# Patient Record
Sex: FEMALE | Race: WHITE | Employment: FULL TIME | URBAN - METROPOLITAN AREA
[De-identification: names, ages, dates, MRNs, and addresses within clinical notes are randomized per-mention and may not be internally consistent; named-entity substitution may affect disease eponyms.]

---

## 2021-10-19 ENCOUNTER — HOSPITAL ENCOUNTER (OUTPATIENT)
Dept: WOMENS IMAGING | Age: 31
Discharge: HOME | End: 2021-10-19
Payer: COMMERCIAL

## 2021-10-19 ENCOUNTER — HOSPITAL ENCOUNTER (OUTPATIENT)
Dept: ULTRASOUND IMAGING | Age: 31
Discharge: HOME | End: 2021-10-19
Payer: COMMERCIAL

## 2021-10-19 DIAGNOSIS — N64.4 BREAST PAIN: ICD-10-CM

## 2021-10-19 PROCEDURE — 76642 ULTRASOUND BREAST LIMITED: CPT

## 2021-10-19 PROCEDURE — G0279 TOMOSYNTHESIS, MAMMO: HCPCS

## 2021-10-19 NOTE — PROGRESS NOTES
Dr. Zebedee Jeans spoke to patient regarding recommendation for breast biopsy. Pt given Dr. Radha Harrell contact information, as Dr. Juan Padilla refers her patients to [address] [place] [address] Surgeons for biopsies and follow up breast care. Pt verbalized understanding and will call to schedule an appt with Dr. Radha Harrell. All questions answered at this time. Records faxed to Dr. Radha Harrell.

## 2022-01-18 ENCOUNTER — ANESTHESIA (OUTPATIENT)
Dept: LABOR AND DELIVERY | Age: 32
End: 2022-01-18
Payer: COMMERCIAL

## 2022-01-18 ENCOUNTER — ANESTHESIA EVENT (OUTPATIENT)
Dept: LABOR AND DELIVERY | Age: 32
End: 2022-01-18
Payer: COMMERCIAL

## 2022-01-18 ENCOUNTER — HOSPITAL ENCOUNTER (OUTPATIENT)
Age: 32
Setting detail: SURGERY ADMIT
Discharge: HOME | End: 2022-01-18
Attending: OBSTETRICS & GYNECOLOGY | Admitting: OBSTETRICS & GYNECOLOGY
Payer: COMMERCIAL

## 2022-01-18 VITALS
OXYGEN SATURATION: 100 % | DIASTOLIC BLOOD PRESSURE: 57 MMHG | BODY MASS INDEX: 19.74 KG/M2 | TEMPERATURE: 97.6 F | HEART RATE: 50 BPM | SYSTOLIC BLOOD PRESSURE: 106 MMHG | WEIGHT: 141 LBS | HEIGHT: 71 IN | RESPIRATION RATE: 14 BRPM

## 2022-01-18 VITALS
DIASTOLIC BLOOD PRESSURE: 57 MMHG | OXYGEN SATURATION: 99 % | TEMPERATURE: 98.6 F | RESPIRATION RATE: 2 BRPM | SYSTOLIC BLOOD PRESSURE: 112 MMHG

## 2022-01-18 DIAGNOSIS — O02.1 MISSED ABORTION WITH FETAL DEMISE BEFORE 20 COMPLETED WEEKS OF GESTATION: Primary | ICD-10-CM

## 2022-01-18 LAB
ABO/RH: NORMAL
ANTIBODY SCREEN: NORMAL
BASOPHILS ABSOLUTE: 0 K/UL (ref 0–0.2)
BASOPHILS RELATIVE PERCENT: 0.3 %
EOSINOPHILS ABSOLUTE: 0.2 K/UL (ref 0–0.6)
EOSINOPHILS RELATIVE PERCENT: 2.9 %
HCT VFR BLD CALC: 40.7 % (ref 36–48)
HEMOGLOBIN: 14.1 G/DL (ref 12–16)
LYMPHOCYTES ABSOLUTE: 1.6 K/UL (ref 1–5.1)
LYMPHOCYTES RELATIVE PERCENT: 23.2 %
MCH RBC QN AUTO: 29.6 PG (ref 26–34)
MCHC RBC AUTO-ENTMCNC: 34.5 G/DL (ref 31–36)
MCV RBC AUTO: 85.6 FL (ref 80–100)
MONOCYTES ABSOLUTE: 0.5 K/UL (ref 0–1.3)
MONOCYTES RELATIVE PERCENT: 7.9 %
NEUTROPHILS ABSOLUTE: 4.5 K/UL (ref 1.7–7.7)
NEUTROPHILS RELATIVE PERCENT: 65.7 %
PDW BLD-RTO: 13.1 % (ref 12.4–15.4)
PLATELET # BLD: 184 K/UL (ref 135–450)
PMV BLD AUTO: 8.7 FL (ref 5–10.5)
RBC # BLD: 4.76 M/UL (ref 4–5.2)
WBC # BLD: 6.8 K/UL (ref 4–11)

## 2022-01-18 PROCEDURE — 2500000003 HC RX 250 WO HCPCS: Performed by: OBSTETRICS & GYNECOLOGY

## 2022-01-18 PROCEDURE — 3700000001 HC ADD 15 MINUTES (ANESTHESIA): Performed by: OBSTETRICS & GYNECOLOGY

## 2022-01-18 PROCEDURE — 88305 TISSUE EXAM BY PATHOLOGIST: CPT

## 2022-01-18 PROCEDURE — 2580000003 HC RX 258: Performed by: OBSTETRICS & GYNECOLOGY

## 2022-01-18 PROCEDURE — 2580000003 HC RX 258: Performed by: NURSE ANESTHETIST, CERTIFIED REGISTERED

## 2022-01-18 PROCEDURE — 86901 BLOOD TYPING SEROLOGIC RH(D): CPT

## 2022-01-18 PROCEDURE — 3700000000 HC ANESTHESIA ATTENDED CARE: Performed by: OBSTETRICS & GYNECOLOGY

## 2022-01-18 PROCEDURE — 86850 RBC ANTIBODY SCREEN: CPT

## 2022-01-18 PROCEDURE — 7100000001 HC PACU RECOVERY - ADDTL 15 MIN: Performed by: OBSTETRICS & GYNECOLOGY

## 2022-01-18 PROCEDURE — 86900 BLOOD TYPING SEROLOGIC ABO: CPT

## 2022-01-18 PROCEDURE — 2500000003 HC RX 250 WO HCPCS: Performed by: NURSE ANESTHETIST, CERTIFIED REGISTERED

## 2022-01-18 PROCEDURE — 3600000012 HC SURGERY LEVEL 2 ADDTL 15MIN: Performed by: OBSTETRICS & GYNECOLOGY

## 2022-01-18 PROCEDURE — 85025 COMPLETE CBC W/AUTO DIFF WBC: CPT

## 2022-01-18 PROCEDURE — 2709999900 HC NON-CHARGEABLE SUPPLY: Performed by: OBSTETRICS & GYNECOLOGY

## 2022-01-18 PROCEDURE — 3600000002 HC SURGERY LEVEL 2 BASE: Performed by: OBSTETRICS & GYNECOLOGY

## 2022-01-18 PROCEDURE — 6360000002 HC RX W HCPCS: Performed by: NURSE ANESTHETIST, CERTIFIED REGISTERED

## 2022-01-18 PROCEDURE — 7100000000 HC PACU RECOVERY - FIRST 15 MIN: Performed by: OBSTETRICS & GYNECOLOGY

## 2022-01-18 PROCEDURE — 51701 INSERT BLADDER CATHETER: CPT

## 2022-01-18 RX ORDER — GLYCOPYRROLATE 0.2 MG/ML
INJECTION INTRAMUSCULAR; INTRAVENOUS PRN
Status: DISCONTINUED | OUTPATIENT
Start: 2022-01-18 | End: 2022-01-18 | Stop reason: SDUPTHER

## 2022-01-18 RX ORDER — KETOROLAC TROMETHAMINE 10 MG/1
10 TABLET, FILM COATED ORAL EVERY 6 HOURS PRN
Qty: 20 TABLET | Refills: 0 | Status: SHIPPED | OUTPATIENT
Start: 2022-01-18 | End: 2022-01-23

## 2022-01-18 RX ORDER — SODIUM CHLORIDE, SODIUM LACTATE, POTASSIUM CHLORIDE, CALCIUM CHLORIDE 600; 310; 30; 20 MG/100ML; MG/100ML; MG/100ML; MG/100ML
INJECTION, SOLUTION INTRAVENOUS CONTINUOUS PRN
Status: DISCONTINUED | OUTPATIENT
Start: 2022-01-18 | End: 2022-01-18 | Stop reason: SDUPTHER

## 2022-01-18 RX ORDER — KETOROLAC TROMETHAMINE 30 MG/ML
INJECTION, SOLUTION INTRAMUSCULAR; INTRAVENOUS PRN
Status: DISCONTINUED | OUTPATIENT
Start: 2022-01-18 | End: 2022-01-18 | Stop reason: SDUPTHER

## 2022-01-18 RX ORDER — OXYCODONE HYDROCHLORIDE AND ACETAMINOPHEN 5; 325 MG/1; MG/1
1 TABLET ORAL EVERY 6 HOURS PRN
Qty: 20 TABLET | Refills: 0 | Status: SHIPPED | OUTPATIENT
Start: 2022-01-18 | End: 2022-01-23

## 2022-01-18 RX ORDER — DEXAMETHASONE SODIUM PHOSPHATE 4 MG/ML
INJECTION, SOLUTION INTRA-ARTICULAR; INTRALESIONAL; INTRAMUSCULAR; INTRAVENOUS; SOFT TISSUE PRN
Status: DISCONTINUED | OUTPATIENT
Start: 2022-01-18 | End: 2022-01-18 | Stop reason: SDUPTHER

## 2022-01-18 RX ORDER — ONDANSETRON 2 MG/ML
INJECTION INTRAMUSCULAR; INTRAVENOUS PRN
Status: DISCONTINUED | OUTPATIENT
Start: 2022-01-18 | End: 2022-01-18 | Stop reason: SDUPTHER

## 2022-01-18 RX ORDER — FENTANYL CITRATE 50 UG/ML
INJECTION, SOLUTION INTRAMUSCULAR; INTRAVENOUS PRN
Status: DISCONTINUED | OUTPATIENT
Start: 2022-01-18 | End: 2022-01-18 | Stop reason: SDUPTHER

## 2022-01-18 RX ORDER — LIDOCAINE HYDROCHLORIDE 20 MG/ML
INJECTION, SOLUTION INFILTRATION; PERINEURAL PRN
Status: DISCONTINUED | OUTPATIENT
Start: 2022-01-18 | End: 2022-01-18 | Stop reason: SDUPTHER

## 2022-01-18 RX ORDER — SODIUM CHLORIDE, SODIUM LACTATE, POTASSIUM CHLORIDE, CALCIUM CHLORIDE 600; 310; 30; 20 MG/100ML; MG/100ML; MG/100ML; MG/100ML
INJECTION, SOLUTION INTRAVENOUS CONTINUOUS
Status: DISCONTINUED | OUTPATIENT
Start: 2022-01-18 | End: 2022-01-18 | Stop reason: HOSPADM

## 2022-01-18 RX ORDER — PROPOFOL 10 MG/ML
INJECTION, EMULSION INTRAVENOUS PRN
Status: DISCONTINUED | OUTPATIENT
Start: 2022-01-18 | End: 2022-01-18 | Stop reason: SDUPTHER

## 2022-01-18 RX ORDER — MIDAZOLAM HYDROCHLORIDE 1 MG/ML
INJECTION INTRAMUSCULAR; INTRAVENOUS PRN
Status: DISCONTINUED | OUTPATIENT
Start: 2022-01-18 | End: 2022-01-18 | Stop reason: SDUPTHER

## 2022-01-18 RX ORDER — EPHEDRINE SULFATE/0.9% NACL/PF 50 MG/5 ML
SYRINGE (ML) INTRAVENOUS PRN
Status: DISCONTINUED | OUTPATIENT
Start: 2022-01-18 | End: 2022-01-18 | Stop reason: SDUPTHER

## 2022-01-18 RX ADMIN — DEXAMETHASONE SODIUM PHOSPHATE 8 MG: 4 INJECTION, SOLUTION INTRAMUSCULAR; INTRAVENOUS at 10:36

## 2022-01-18 RX ADMIN — Medication 10 MG: at 10:30

## 2022-01-18 RX ADMIN — PROPOFOL 200 MG: 10 INJECTION, EMULSION INTRAVENOUS at 10:26

## 2022-01-18 RX ADMIN — MIDAZOLAM 2 MG: 1 INJECTION INTRAMUSCULAR; INTRAVENOUS at 10:12

## 2022-01-18 RX ADMIN — SODIUM CHLORIDE, SODIUM LACTATE, POTASSIUM CHLORIDE, AND CALCIUM CHLORIDE: .6; .31; .03; .02 INJECTION, SOLUTION INTRAVENOUS at 10:16

## 2022-01-18 RX ADMIN — FENTANYL CITRATE 100 MCG: 50 INJECTION INTRAMUSCULAR; INTRAVENOUS at 10:26

## 2022-01-18 RX ADMIN — SODIUM CHLORIDE, SODIUM LACTATE, POTASSIUM CHLORIDE, AND CALCIUM CHLORIDE: .6; .31; .03; .02 INJECTION, SOLUTION INTRAVENOUS at 08:00

## 2022-01-18 RX ADMIN — LIDOCAINE HYDROCHLORIDE 50 MG: 20 INJECTION, SOLUTION INFILTRATION; PERINEURAL at 10:26

## 2022-01-18 RX ADMIN — DOXYCYCLINE 200 MG: 100 INJECTION, POWDER, LYOPHILIZED, FOR SOLUTION INTRAVENOUS at 08:30

## 2022-01-18 RX ADMIN — ONDANSETRON 4 MG: 2 INJECTION INTRAMUSCULAR; INTRAVENOUS at 10:26

## 2022-01-18 RX ADMIN — GLYCOPYRROLATE 0.2 MG: 0.2 INJECTION, SOLUTION INTRAMUSCULAR; INTRAVENOUS at 10:32

## 2022-01-18 RX ADMIN — FAMOTIDINE 20 MG: 10 INJECTION, SOLUTION INTRAVENOUS at 08:48

## 2022-01-18 RX ADMIN — KETOROLAC TROMETHAMINE 30 MG: 30 INJECTION, SOLUTION INTRAMUSCULAR at 10:43

## 2022-01-18 ASSESSMENT — PULMONARY FUNCTION TESTS
PIF_VALUE: 13
PIF_VALUE: 3
PIF_VALUE: 3
PIF_VALUE: 10
PIF_VALUE: 1
PIF_VALUE: 3
PIF_VALUE: 0
PIF_VALUE: 6
PIF_VALUE: 2
PIF_VALUE: 2
PIF_VALUE: 4
PIF_VALUE: 0
PIF_VALUE: 3
PIF_VALUE: 3
PIF_VALUE: 4
PIF_VALUE: 0
PIF_VALUE: 0
PIF_VALUE: 1
PIF_VALUE: 0
PIF_VALUE: 3
PIF_VALUE: 1
PIF_VALUE: 0
PIF_VALUE: 3
PIF_VALUE: 10
PIF_VALUE: 7
PIF_VALUE: 7
PIF_VALUE: 3
PIF_VALUE: 7
PIF_VALUE: 3
PIF_VALUE: 1
PIF_VALUE: 1
PIF_VALUE: 0
PIF_VALUE: 3
PIF_VALUE: 3
PIF_VALUE: 14
PIF_VALUE: 3
PIF_VALUE: 10
PIF_VALUE: 3
PIF_VALUE: 0

## 2022-01-18 ASSESSMENT — LIFESTYLE VARIABLES: SMOKING_STATUS: 1

## 2022-01-18 NOTE — ANESTHESIA POSTPROCEDURE EVALUATION
Department of Anesthesiology  Postprocedure Note    Patient: Raz Devries  MRN: [id number]  YOB: 1990  Date of evaluation: 1/18/2022  Time:  11:09 AM     Procedure Summary     Date: 01/18/22 [facility] [address] [place] [address]    Anesthesia Start: 1016 Anesthesia Stop: 1100    Procedure: DILATATION AND CURETTAGE SUCTION (N/A Uterus) Diagnosis: (caminiti,2/0, d&c w/ suction)    Surgeons: Catarino Tinajero MD Responsible Provider: Edgardo Pham MD    Anesthesia Type: general ASA Status: 2          Anesthesia Type: general    Tonie Phase I: Tonie Score: 10    Tonie Phase II:      Last vitals: Reviewed and per EMR flowsheets.        Anesthesia Post Evaluation    Patient location: Recovering in OR due to Covid 19+  Patient participation: complete - patient participated  Level of consciousness: awake and alert  Pain score: 0  Airway patency: patent  Nausea & Vomiting: no vomiting and no nausea  Complications: no  Cardiovascular status: blood pressure returned to baseline and hemodynamically stable  Respiratory status: acceptable and room air  Hydration status: stable

## 2022-01-18 NOTE — ANESTHESIA PRE PROCEDURE
Department of Anesthesiology  Preprocedure Note       Name:  Pratik Patton   Age:  36 y.o.  :  1990                                          MRN:  [id number]         Date:  2022      Surgeon: Naa Paul):  aJne Carroll MD    Procedure: Procedure(s):  DILATATION AND CURETTAGE SUCTION    Medications prior to admission:   Prior to Admission medications    Medication Sig Start Date End Date Taking? Authorizing Provider   loratadine (CLARITIN) 10 MG tablet Take 1 tablet by mouth daily. Edgar Cardenas MD   fluticasone (FLONASE) 50 MCG/ACT nasal spray 1 spray by Nasal route daily. 6/16/14 6/16/15  Edgar Cardenas MD   montelukast (SINGULAIR) 10 MG tablet Take 1 tablet by mouth nightly. 14   Edgar Cardenas MD       Current medications:    Current Facility-Administered Medications   Medication Dose Route Frequency Provider Last Rate Last Admin    famotidine (PEPCID) injection 20 mg  20 mg IntraVENous Once KEON Medina - CRNA        lactated ringers infusion   IntraVENous Continuous Lorene Oliva MD           Allergies: Allergies   Allergen Reactions    Zithromax [Azithromycin Dihydrate] Nausea Only       Problem List:    Patient Active Problem List   Diagnosis Code    Tonsillitis J03.90    Migraine headache G43.909    Allergic rhinitis J30.9       Past Medical History:        Diagnosis Date    Allergic rhinitis, seasonal 2009    Migraine headache 2009       Past Surgical History:  No past surgical history on file. Social History:    Current Smoker, 1/2 PPD                             Counseling given: Not Answered  Comment: counseled on tobacco use exposure avoidance      Vital Signs (Current): There were no vitals filed for this visit.                                            BP Readings from Last 3 Encounters:   14 116/64   14 (!) 88/70   11 102/70       NPO Status:  Nothing since MN BMI:   Wt Readings from Last 3 Encounters:   07/28/14 138 lb (62.6 kg)   06/16/14 135 lb (61.2 kg)   05/21/11 149 lb (67.6 kg) (80 %, Z= 0.86)*     * Growth percentiles are based on [facility] (Girls, 2-20 Years) data. There is no height or weight on file to calculate BMI.    CBC: No results found for: WBC, RBC, HGB, HCT, MCV, RDW, PLT    CMP: No results found for: NA, K, CL, CO2, BUN, CREATININE, GFRAA, AGRATIO, LABGLOM, GLUCOSE, GLU, PROT, CALCIUM, BILITOT, ALKPHOS, AST, ALT    POC Tests: No results for input(s): POCGLU, POCNA, POCK, POCCL, POCBUN, POCHEMO, POCHCT in the last 72 hours. Coags: No results found for: PROTIME, INR, APTT    HCG (If Applicable): No results found for: PREGTESTUR, PREGSERUM, HCG, HCGQUANT     ABGs: No results found for: PHART, PO2ART, BDL2DGM, FQY0HQI, BEART, G0TCLWWD     Type & Screen (If Applicable):  No results found for: LABABO, LABRH    Drug/Infectious Status (If Applicable):  No results found for: HIV, HEPCAB    COVID-19 Screening (If Applicable): No results found for: COVID19        Anesthesia Evaluation  Patient summary reviewed no history of anesthetic complications:   Airway: Mallampati: I  TM distance: >3 FB   Neck ROM: full  Mouth opening: > = 3 FB Dental: normal exam         Pulmonary:normal exam    (+) current smoker          Patient smoked on day of surgery. ROS comment: Pt tested positive for Covid 19 8 days ago. Mild symptoms of only a low grade fever. BEBS, clear. No cough or SOB. Cardiovascular:Negative CV ROS  Exercise tolerance: good (>4 METS),           Rhythm: regular  Rate: normal           Beta Blocker:  Not on Beta Blocker         Neuro/Psych:   (+) headaches: migraine headaches,             GI/Hepatic/Renal: Neg GI/Hepatic/Renal ROS            Endo/Other: Negative Endo/Other ROS                    Abdominal:             Vascular: negative vascular ROS.          Other Findings:           Anesthesia Plan      general ASA 2       Induction: intravenous. MIPS: Postoperative opioids intended and Prophylactic antiemetics administered. Anesthetic plan and risks discussed with patient. Use of blood products discussed with patient whom consented to blood products.                    KEON Miramontes - CRNA   1/18/2022

## 2022-01-18 NOTE — FLOWSHEET NOTE
Vital signs stable in recovery, no vaginal bleeding, IV fluids running without difficulty, patient denies pain. Warm blankets and emotional support given. Will continue to monitor. room air

## 2022-01-18 NOTE — FLOWSHEET NOTE
Patient recovery in OR ended, transferred back to Triage room to continue recovery with mother at bedside.

## 2022-01-18 NOTE — PLAN OF CARE
Problem: Pain:  Goal: Pain level will decrease  Description: Pain level will decrease  Outcome: Ongoing   Denies at this time.   Problem: Nausea/Vomiting:  Goal: Absence of nausea/vomiting  Description: Absence of nausea/vomiting  Outcome: Ongoing

## 2022-01-18 NOTE — FLOWSHEET NOTE
Dr. Washington Reason at bedside to talk to patient, all questions answered and patient denies further questions or concerns at this time.

## 2022-01-18 NOTE — H&P
Department of Obstetrics and Gynecology   Obstetrics History and Physical        CHIEF COMPLAINT:  MIssed ab    HISTORY OF PRESENT ILLNESS:    Samir Maria  is a 36 y.o.  female at 7w1d presents with a chief complaint as above and is being admitted for missed ab (GS with 6wk FP with no cardiac activity)  VB and Cramps. Covid + last week.     Estimated Due Date: Estimated Date of Delivery: 22    PRENATAL CARE: Complicated by: SAB    PAST OB HISTORY:  OB History        2    Para        Term                AB   2    Living           SAB   1    IAB        Ectopic        Molar        Multiple        Live Births                  Past Medical History:        Diagnosis Date    Allergic rhinitis, seasonal 2009    Migraine headache 2009     Past Surgical History:        Procedure Laterality Date    NASAL SEPTUM SURGERY  2013    WISDOM TOOTH EXTRACTION       Allergies:  Zithromax [azithromycin dihydrate]  Social History:    Social History     Socioeconomic History    Marital status: Single     Spouse name: Not on file    Number of children: Not on file    Years of education: Not on file    Highest education level: Not on file   Occupational History    Not on file   Tobacco Use    Smoking status: Current Every Day Smoker     Packs/day: 0.50     Years: 10.00     Pack years: 5.00     Types: Cigarettes    Smokeless tobacco: Never Used    Tobacco comment: counseled on tobacco use exposure avoidance   Substance and Sexual Activity    Alcohol use: No    Drug use: No    Sexual activity: Never   Other Topics Concern    Not on file   Social History Narrative    Not on file     Social Determinants of Health     Financial Resource Strain:     Difficulty of Paying Living Expenses: Not on file   Food Insecurity:     Worried About Running Out of Food in the Last Year: Not on file    Enrique of Food in the Last Year: Not on file   Transportation Needs:     Lack of Transportation (Medical): Not on file    Lack of Transportation (Non-Medical): Not on file   Physical Activity:     Days of Exercise per Week: Not on file    Minutes of Exercise per Session: Not on file   Stress:     Feeling of Stress : Not on file   Social Connections:     Frequency of Communication with Friends and Family: Not on file    Frequency of Social Gatherings with Friends and Family: Not on file    Attends Voodoo Services: Not on file    Active Member of [address] [place] [address] [employer] or Organizations: Not on file    Attends Club or Organization Meetings: Not on file    Marital Status: Not on file   Intimate Partner Violence:     Fear of Current or Ex-Partner: Not on file    Emotionally Abused: Not on file    Physically Abused: Not on file    Sexually Abused: Not on file   Housing Stability:     Unable to Pay for Housing in the Last Year: Not on file    Number of [place] in the Last Year: Not on file    Unstable Housing in the Last Year: Not on file     Family History:   No family history on file. Medications Prior to Admission:  Medications Prior to Admission: loratadine (CLARITIN) 10 MG tablet, Take 1 tablet by mouth daily. fluticasone (FLONASE) 50 MCG/ACT nasal spray, 1 spray by Nasal route daily. montelukast (SINGULAIR) 10 MG tablet, Take 1 tablet by mouth nightly. REVIEW OF SYSTEMS:  negative     PHYSICAL EXAM:    Vitals:    01/18/22 0737 01/18/22 0739   BP: (!) 100/54    Pulse: 50    Resp: 16    Temp: 98.6 °F (37 °C)    TempSrc: Oral    SpO2: 100% 100%     General appearance:  awake, alert, cooperative, no apparent distress, and appears stated age  Neurologic:  Awake, alert, oriented to name, place and time. Lungs:  No increased work of breathing, good air exchange  Abdomen:  Soft, non tender    ASSESSMENT:  Missed ab  PLAN: Admit for D&C      I have presented reasonable alternatives to the patient's proposed care, treatment, and services.  The discussion I have done encompassed risks, benefits, and side effects related to the alternatives and the risks related to not receiving the proposed care, treatment, and services. All questions answered. Patient wishes to proceed. The surgical site was confirmed by the patient and me.       Electronically signed by Jeanine Winter MD on 1/18/2022 at 10:10 AM

## 2022-01-18 NOTE — FLOWSHEET NOTE
Patient desires to get up to restroom, patient sat on side of bed before standing up, patient denies any dizziness or lightheadedness. Pt up to bathroom and voided without difficulty while this RN remains at bedside. Pad given to patient and patient instructed she can get dressed at this time if she desires.

## 2022-01-18 NOTE — OP NOTE
OPERATIVE REPORT      Pre-operative Diagnosis: Missed ab 6wk  Post-operative Diagnosis: the same  Procedure: Hysteroscopy and D&C  Anesthesia: General  Surgeon: Esperanza Garvey  Assistant(s): None  Findings: POCs  Complications: none  Specimens: endometrial curetting  Urine Output: 150ml mL  Estimated blood loss: 200ml mL     INDICATIONS:  Patient is Kendra Chino  is a 36 y.o.  female   who presented to operating room for scheduled hysteroscopy and D&C. She has a history of heavy vaginal bleeding for the past several days. Vaginal ultrasound demonstrated a fetal pole with no cardiac activity and 6 wk 3/7 days. +Covid last week. Based on the above, the decision was made to proceed with hysteroscopy and D&C. Prior to the procedure we discussed risks, benefits and possible complications of  procedure which included, but were not limited to risk of uterine perforation, that might require additional procedure, risk of infection, risk of fluid overload, risk of bleeding and blood transfusion which carries risk for HIV or hepatitis transmission, allergic reaction, risk of adhesive disease or scar formation in the uterus, risk of blood clot/embolism, risk of anesthesia, cardiac arrest, and remote risk of death. Patient accepted the risks and elected to proceed with procedure. PROCEDURE: The patient was taken to the operating room with IV running and pneumo boots applied to the lower extremities. General anesthesia was administered without difficulty and was adequate. The patient was then placed in the dorsal lithotomy position with her legs in candy-cane stirrups. An examination under anesthesia revealed a mobile anteverted  uterus, enlarged to 6 weeks pregnancy, with regular surface. The patient was then prepped and draped in a regular sterile fashion. Time-out was performed.       Two Marino retractors were utilized in the patient's vagina, cervix was visualized and  grabbed with a single-tooth tenaculum at the anterior lip. The cervical canal was  sequentially dilated to accommodate a 7 fr suction curette. A suction curettage was performed. A sharp curette was used to gently curettage the uterine cavity in a clockwise fashion in all aspects of the uterus Curettings were sent to Pathology for permanent section. Following this, the procedure was abandoned. The tenaculum was removed from the cervix and good hemostasis was noted at puncture site. The patient tolerated the procedure well. The instrument and sponge counts were correct x 2. The patient was awakened from general anesthesia and taken to the recovery room in satisfactory condition.

## 2022-01-18 NOTE — FLOWSHEET NOTE
Procedure completed at [phone], patient will remain in OR for negative pressure room to recover per Latoya William, due to status post positive covid 8 days ago.

## 2022-01-18 NOTE — FLOWSHEET NOTE
Discharge instructions and medications reviewed with patient. Scripts given. Pt. Verbalized understanding. Denies questions. Discontinued IV without complications. Pt. tolerated well.   Pt wheeled out in wheelchair

## 2022-01-18 NOTE — FLOWSHEET NOTE
Pt states she was positive for Covid 19 last week on 1/10 and her last symptom was Tuesday 1/11. Will contact Dr. Jennifer Lujan and Quinn Gutiérrez CRNA to establish plan of care.

## (undated) DEVICE — SPONGE LAP W18XL18IN WHT COT 4 PLY FLD STRUNG RADPQ DISP ST

## (undated) DEVICE — CANISTER, RIGID, 1200CC: Brand: MEDLINE INDUSTRIES, INC.

## (undated) DEVICE — TRAP TISS DISP FOR COLL SYS BERK SAFETOUCH

## (undated) DEVICE — Z DISCONTINUED USE 2659136 CANNULA VAC DIA12MM CRV SEMI RIG W/ ROUNDED TIP TAPR END

## (undated) DEVICE — COVER LT HNDL BLU PLAS

## (undated) DEVICE — SOLUTION IV IRRIG WATER 500ML POUR BRL ST 2F7113

## (undated) DEVICE — CATHETER,URETHRAL,VINYL,MALE,16",16 FR: Brand: MEDLINE

## (undated) DEVICE — Z DISCONTINUED USE 2659139 CANNULA VAC DIA7MM STR SEMI RIG ROUNDED TIP DISP BERK

## (undated) DEVICE — TRAY SKIN PREP GELWITH 2 SPNG

## (undated) DEVICE — GLOVE,SURG,SENSICARE SLT,LF,PF,6.5: Brand: MEDLINE

## (undated) DEVICE — COVER US PRB W13XL244CM SURGICAL INTRAOPERATIVE W RUBBERBAND

## (undated) DEVICE — GLOVE,SURG,SENSICARE SLT,LF,PF,7.5: Brand: MEDLINE

## (undated) DEVICE — Z DISCONTINUED USE 2659131 CANNULA VAC DIA7MM FLX ROUNDED TIP W/ 2 PRT HI QUAL DISP

## (undated) DEVICE — PACK PROCEDURE SURG VAG DEL REV

## (undated) DEVICE — SET COLL TBNG L6FT DIA3/8IN W/ INTEGR SWVL HNDL SLIP RNG M

## (undated) DEVICE — Z DISCONTINUED USE 2659141 CANNULA VAC DIA9MM STR SEMI RIG ROUNDED TIP DISP BERK

## (undated) DEVICE — HOSE CONN L18IN UTER DISP FOR BERK SAFETOUCH SYS

## (undated) DEVICE — Z DISCONTINUED USE 2659140 CANNULA VAC DIA8MM STR SEMI RIG ROUNDED TIP DISP BERK

## (undated) DEVICE — PAD,NON-ADHERENT,3X8,STERILE,LF,1/PK: Brand: MEDLINE